# Patient Record
Sex: MALE | Race: OTHER | HISPANIC OR LATINO | URBAN - METROPOLITAN AREA
[De-identification: names, ages, dates, MRNs, and addresses within clinical notes are randomized per-mention and may not be internally consistent; named-entity substitution may affect disease eponyms.]

---

## 2023-02-13 ENCOUNTER — EMERGENCY (EMERGENCY)
Facility: HOSPITAL | Age: 63
LOS: 1 days | Discharge: HOME | End: 2023-02-13
Attending: EMERGENCY MEDICINE
Payer: MEDICAID

## 2023-02-13 VITALS
SYSTOLIC BLOOD PRESSURE: 129 MMHG | HEART RATE: 79 BPM | TEMPERATURE: 99 F | RESPIRATION RATE: 17 BRPM | DIASTOLIC BLOOD PRESSURE: 77 MMHG | WEIGHT: 261.91 LBS | HEIGHT: 66 IN | OXYGEN SATURATION: 97 %

## 2023-02-13 PROCEDURE — 99284 EMERGENCY DEPT VISIT MOD MDM: CPT

## 2023-02-13 PROCEDURE — 99284 EMERGENCY DEPT VISIT MOD MDM: CPT | Mod: 25

## 2023-02-13 PROCEDURE — 93971 EXTREMITY STUDY: CPT

## 2023-02-13 PROCEDURE — 93971 EXTREMITY STUDY: CPT | Mod: 26,RT

## 2023-02-13 RX ADMIN — Medication 100 MILLIGRAM(S): at 18:13

## 2023-02-13 NOTE — ED PROVIDER NOTE - PATIENT PORTAL LINK FT
You can access the FollowMyHealth Patient Portal offered by [facility] by registering at the following website: [email or web address]. By joining [employer]’s FollowMyHealth portal, you will also be able to view your health information using other applications (apps) compatible with our system.

## 2023-02-13 NOTE — ED PROVIDER NOTE - NSFOLLOWUPINSTRUCTIONS_ED_ALL_ED_FT
Cellulitis, Adult    A person's legs and feet. One leg is normal and the other leg is affected by cellulitis.   Cellulitis is a skin infection. The infected area is usually warm, red, swollen, and tender. This condition occurs most often in the arms and lower legs. The infection can travel to the muscles, blood, and underlying tissue and become serious. It is very important to get treated for this condition.      What are the causes?    Cellulitis is caused by bacteria. The bacteria enter through a break in the skin, such as a cut, burn, insect bite, open sore, or crack.      What increases the risk?    This condition is more likely to occur in people who:  •Have a weak body defense system (immune system).      •Have open wounds on the skin, such as cuts, burns, bites, and scrapes. Bacteria can enter the body through these open wounds.      •Are older than 60 years of age.      •Have diabetes.      •Have a type of long-lasting (chronic) liver disease (cirrhosis) or kidney disease.      •Are obese.    •Have a skin condition such as:  •Itchy rash (eczema).      •Slow movement of blood in the veins (venous stasis).      •Fluid buildup below the skin (edema).        •Have had radiation therapy.      •Use IV drugs.        What are the signs or symptoms?    Symptoms of this condition include:  •Redness, streaking, or spotting on the skin.      •Swollen area of the skin.      •Tenderness or pain when an area of the skin is touched.      •Warm skin.      •A fever.      •Chills.      •Blisters.        How is this diagnosed?    This condition is diagnosed based on a medical history and physical exam. You may also have tests, including:  •Blood tests.      •Imaging tests.        How is this treated?    Treatment for this condition may include:  •Medicines, such as antibiotic medicines or medicines to treat allergies (antihistamines).      •Supportive care, such as rest and application of cold or warm cloths (compresses) to the skin.      •Hospital care, if the condition is severe.      The infection usually starts to get better within 1–2 days of treatment.      Follow these instructions at home:  A comparison of three sample cups showing dark yellow, yellow, and pale yellow urine.   Medicines     •Take over-the-counter and prescription medicines only as told by your health care provider.      •If you were prescribed an antibiotic medicine, take it as told by your health care provider. Do not stop taking the antibiotic even if you start to feel better.      General instructions     •Drink enough fluid to keep your urine pale yellow.      • Do not touch or rub the infected area.      •Raise (elevate) the infected area above the level of your heart while you are sitting or lying down.      •Apply warm or cold compresses to the affected area as told by your health care provider.      •Keep all follow-up visits as told by your health care provider. This is important. These visits let your health care provider make sure a more serious infection is not developing.        Contact a health care provider if:    •You have a fever.      •Your symptoms do not begin to improve within 1–2 days of starting treatment.      •Your bone or joint underneath the infected area becomes painful after the skin has healed.      •Your infection returns in the same area or another area.      •You notice a swollen bump in the infected area.      •You develop new symptoms.      •You have a general ill feeling (malaise) with muscle aches and pains.        Get help right away if:    •Your symptoms get worse.      •You feel very sleepy.      •You develop vomiting or diarrhea that persists.      •You notice red streaks coming from the infected area.      •Your red area gets larger or turns dark in color.      These symptoms may represent a serious problem that is an emergency. Do not wait to see if the symptoms will go away. Get medical help right away. Call your local emergency services (911 in the [place] Do not drive yourself to the hospital.       Summary    •Cellulitis is a skin infection. This condition occurs most often in the arms and lower legs.      •Treatment for this condition may include medicines, such as antibiotic medicines or antihistamines.      •Take over-the-counter and prescription medicines only as told by your health care provider. If you were prescribed an antibiotic medicine, do not stop taking the antibiotic even if you start to feel better.      •Contact a health care provider if your symptoms do not begin to improve within 1–2 days of starting treatment or your symptoms get worse.      •Keep all follow-up visits as told by your health care provider. This is important. These visits let your health care provider make sure that a more serious infection is not developing.      This information is not intended to replace advice given to you by your health care provider. Make sure you discuss any questions you have with your health care provider.

## 2023-02-13 NOTE — ED PROVIDER NOTE - PHYSICAL EXAMINATION
Right upper arm with erythema and warmth which is well-circumscribed there is no swelling no fluctuance.  There is 2+ radial pulse.  No appreciable swelling to the remainder of the arm.  Heart is regular lungs are clear

## 2023-02-13 NOTE — ED ADULT TRIAGE NOTE - GLASGOW COMA SCALE: EYE OPENING, MLM
COPD EDUCATION by COPD CLINICAL EDUCATOR  11/30/2019 at 6:32 AM by Jaylin Hurd     Patient interviewed by COPD education team. Smoking Cessation Intervention declined by patient- she states cessation >6months ago        
(E4) spontaneous

## 2023-02-13 NOTE — ED ADULT TRIAGE NOTE - CHIEF COMPLAINT QUOTE
Pt reports received flu and pneumonia vaccine on 02/09, c/o Rt arm pain and redness to injection site x 4 days.

## 2023-02-13 NOTE — ED PROVIDER NOTE - CLINICAL SUMMARY MEDICAL DECISION MAKING FREE TEXT BOX
Patient with right arm redness and warmth suggestive of cellulitis.  Duplex is negative for DVT.  no suspicion for abscess. Home with antibiotics and return precautions.

## 2023-02-13 NOTE — ED PROVIDER NOTE - OBJECTIVE STATEMENT
Patient with hypertension presents with several days of right upper arm swelling and pain.  He had flu vaccine in the left arm and he thinks the pneumonia vaccine in the right arm about a week ago.  He then subsequently developed swelling to the right upper arm that is been getting progressively.  Patient also notes subjective fever.  Denies any other trauma.

## 2023-05-17 ENCOUNTER — EMERGENCY (EMERGENCY)
Facility: HOSPITAL | Age: 63
LOS: 1 days | Discharge: HOME | End: 2023-05-17
Attending: EMERGENCY MEDICINE
Payer: MEDICAID

## 2023-05-17 VITALS
HEIGHT: 66 IN | HEART RATE: 74 BPM | TEMPERATURE: 98 F | SYSTOLIC BLOOD PRESSURE: 130 MMHG | RESPIRATION RATE: 20 BRPM | WEIGHT: 257.94 LBS | DIASTOLIC BLOOD PRESSURE: 83 MMHG | OXYGEN SATURATION: 96 %

## 2023-05-17 PROCEDURE — 99283 EMERGENCY DEPT VISIT LOW MDM: CPT

## 2023-05-17 RX ORDER — PSEUDOEPHEDRINE HCL 30 MG
1 TABLET ORAL
Qty: 7 | Refills: 0
Start: 2023-05-17 | End: 2023-05-23

## 2023-05-17 RX ORDER — DEXAMETHASONE 0.5 MG/5ML
12 ELIXIR ORAL ONCE
Refills: 0 | Status: COMPLETED | OUTPATIENT
Start: 2023-05-17 | End: 2023-05-17

## 2023-05-17 RX ORDER — PSEUDOEPHEDRINE HCL 30 MG
60 TABLET ORAL ONCE
Refills: 0 | Status: COMPLETED | OUTPATIENT
Start: 2023-05-17 | End: 2023-05-17

## 2023-05-17 RX ORDER — KETOTIFEN FUMARATE 0.34 MG/ML
1 SOLUTION OPHTHALMIC
Qty: 5 | Refills: 0
Start: 2023-05-17 | End: 2023-05-23

## 2023-05-17 RX ORDER — FLUTICASONE PROPIONATE 50 MCG
1 SPRAY, SUSPENSION NASAL
Qty: 9 | Refills: 0
Start: 2023-05-17 | End: 2023-06-15

## 2023-05-17 RX ADMIN — Medication 1 TABLET(S): at 15:10

## 2023-05-17 RX ADMIN — Medication 12 MILLIGRAM(S): at 15:11

## 2023-05-17 RX ADMIN — Medication 60 MILLIGRAM(S): at 15:10

## 2023-05-17 NOTE — ED PROVIDER NOTE - IV ALTEPLASE ADMIN OUTSIDE HIDDEN
Terry Chauhan is being seen as follow up.    PCP:  Yahir Acosta APNP   Medication verified and med list updated  Denies known Latex allergy or symptoms of Latex sensitivity  Tobacco history verified.  Patient would like communication of their results via:  [phone]  Verbal permission granted by patient to leave a detailed message with medical information on answering machine at phone number given?  yes    If female, are you pregnant, trying to become pregnant, or breastfeeding?  NA  Pacemaker/Defibrillator:  no  Taking blood thinners:  Yes Eliquis   Allergy to lidocaine:  no  Nurse's notes reviewed and accepted.    CHIEF COMPLAINT:  Office Visit and Derm Problem    HISTORY OF PRESENT ILLNESS:  Terry Chauhan is a 86 year old male who presents for evaluation of SARA right arm, SARA left shoulder.    Location:  Right arm  Duration:  6 months  Symptoms:  no associated symptoms  Changes over time:  growing in size and got hard  Treatments:  None    Location:  Left shoulder  Duration:  1 month  Symptoms:  no associated symptoms  Changes over time:  growing in size  Treatments:  none    History of skin cancer--Basal cell carcinoma right lateral neck 12-8-22 status post excision by Dr. Salinas   Family history of skin cancer--No family history of skin cancer  History of severe sunburns--Negative  Sunscreens--negative  Outdoor hobbies--gardening, outside all the time  Occupation--retired auto body work   Tanning varela use--none   Last total skin examination--3/17/23    ALLERGIES:   Allergen Reactions   • Codeine Camsylate HIVES   • Penicillins HIVES       Medication list verified.    PHYSICAL EXAMINATION:  Wt Readings from Last 1 Encounters:   05/01/23 91.6 kg (202 lb)         Constitutional:  No acute distress, well nourished and well groomed  Neurologic:  Alert & oriented to person, place and time, appropriate affect and mood and pleasant   Skin:  Upper body skin exam performed today including head (face and  scalp), neck, chest, abdomen, back, right upper extremity, left upper extremity.  Pertinent findings include:   - Left upper arm 4 mm thick crust underlying erythema  - R forearm 4 mm hyperkeratotic papule with underlying erythema   - depressed erythematous papule, left alar rim                       ASSESSMENT/PLAN:  Neoplasm of uncertain behavior, left alar rim/left upper arm/right forearm:  Lesions on the left upper arm and right forearm were biopsy today to rule out AK versus nonmelanoma skin cancer versus wart.  Clinically, they were both cutaneous horns.  Depressed plaque on the left alar rim biopsy to rule out basal cell carcinoma versus scar.  - Surgical Pathology, Dermatologic  - TANGENTIAL BIOPSY OF SKIN SINGLE LESION  - TANGENTIAL BIOPSY OF SKIN EACH SEPARATE OR ADDITIONAL LESION       Return pending biopsy results.     Diagnosis was discussed with patient.  When applicable, possible treatments, including medications were discussed, as well as alternative treatments.  Potential side effects were reviewed and patient voiced understanding.  All questions were answered.      On 5/4/2023, ILorraine RN scribed the services personally performed by Jocelyn Naranjo MD   The documentation recorded by the scribe accurately and completely reflects the service(s) I personally performed and the decisions made by me.     Shave Biopsy  Correct patient, procedure, site verified.  Verbal and written consent obtained.  Risks and benefits of the procedure were discussed including pain, bleeding, infection, scarring, nerve damage.  The left alar rim, left upper arm and right forearm (site) was/were cleansed with alcohol and anesthetized with 1% lidocaine with epinephrine.  The lesion was removed using a Dermablade.  Hemostasis was achieved using electrocautery followed by aluminum chloride.  Estimated blood loss less than 0.3 mL.  The site was covered with petrolatum ointment and a bandage.  Specimen was  labeled and sent to pathology.   Patient instructed to keep the area clean and dry for 24 hours and wound care instructions were given.  Patient tolerated the procedure well without any complications.           show

## 2023-05-17 NOTE — ED PROVIDER NOTE - PROGRESS NOTE DETAILS
feeling improved. advised cessation of oxymetolazone as will make congestion worse. rx for decongestants and abx for likely sinusitis. f/u with ENT. return precautions discussed.

## 2023-05-17 NOTE — ED ADULT NURSE NOTE - NSFALLUNIVINTERV_ED_ALL_ED
Bed/Stretcher in lowest position, wheels locked, appropriate side rails in place/Call bell, personal items and telephone in reach/Instruct patient to call for assistance before getting out of bed/chair/stretcher/Non-slip footwear applied when patient is off stretcher/Prole to call system/Physically safe environment - no spills, clutter or unnecessary equipment/Purposeful proactive rounding/Room/bathroom lighting operational, light cord in reach

## 2023-05-17 NOTE — ED PROVIDER NOTE - NSFOLLOWUPINSTRUCTIONS_ED_ALL_ED_FT
Infección de los senos paranasales en los adultos  Sinus Infection, Adult  A person's face, and a person's face showing an internal view of the sinuses. Here the sinuses contain mucus.  La infección de los senos paranasales, también llamada sinusitis, es la inflamación de los senos paranasales. Los senos paranasales son espacios vacíos en los huesos alrededor del erasmo. Los senos paranasales se encuentran en estos lugares:  Alrededor de los ojos.  En la mitad de la frente.  Detrás de la nariz.  En los pómulos.  Normalmente, la mucosidad drena a través de los senos. Cuando los tejidos nasales se inflaman o hinchan, la mucosidad puede quedar atrapada o bloqueada. [place] fomenta la proliferación de bacterias, virus y hongos, lo que produce infecciones. La mayoría de las infecciones de los senos paranasales son provocadas por un virus.    Tiago infección de los senos paranasales puede desarrollarse rápidamente. Puede durar hasta 4 semanas (aguda) o más de 12 semanas (crónica). Con frecuencia, la infección de los senos paranasales aparece después de un resfriado.    ¿Cuáles son las causas?  Esta afección es causada por cualquier sustancia que inflame los senos o evite que la mucosidad drene. [place] incluye:  Alergias.  Asma.  Infección por bacterias o virus.  Deformidades u obstrucciones en la nariz o los senos paranasales.  Crecimientos anormales en la nariz (pólipos nasales).  Agentes contaminantes, lady sustancias químicas o irritantes presentes en el aire.  Infección por hongos. [place] es poco frecuente.  ¿Qué incrementa el riesgo?  Es más probable que tenga esta afección si:  Tienen debilitado el sistema de defensa del organismo (sistema inmunitario).  [place] o bucea mucho.  Abusa de los aerosoles nasales.  Fuma.  ¿Cuáles son los signos o síntomas?  Los principales síntomas de esta afección son dolor y sensación de presión alrededor de los senos paranasales afectados. Otros síntomas incluyen:  Nariz tapada o congestión que dificulta la respiración por la nariz.  Secreción espesa de color amarillo o verdoso que sale de la nariz.  Dolor, hinchazón y calor en los senos paranasales afectados.  Tos que puede empeorar por la noche.  Disminución del sentido del olfato y del gusto.  Mucosidad excesiva que se acumula en la garganta o la parte posterior de la nariz (goteo posnasal) y causa dolor de garganta o mal aliento.  Cansancio (fatiga).  Fiebre.  ¿Cómo se diagnostica?  Esta afección se diagnostica en función de lo siguiente:  Los síntomas.  [place] antecedentes médicos.  Un examen físico.  Pruebas para averiguar si la afección es aguda o crónica. Pueden incluir:  Revisar la nariz para evelyn si tiene pólipos nasales.  Observar los senos paranasales con un dispositivo que tiene tiago destiny (endoscopio).  Hacer pruebas para detectar alergias o bacterias.  Pruebas de diagnóstico por imágenes, lady resonancia magnética (RM) o tiago exploración por tomografía computarizada (TC).  En contadas ocasiones, se puede realizar tiago biopsia de hueso para descartar tipos más graves de infecciones por hongos en los senos paranasales.    ¿Cómo se trata?  El tratamiento para la infección de los senos paranasales depende de la causa y de si la afección es crónica o aguda.  Si la causa es un virus, los síntomas deberían desaparecer solos en el término de 10 días. Pueden darle medicamentos para aliviar los síntomas. Incluyen lo siguiente:  Medicamentos para encoger las fosas nasales hinchadas (descongestivos).  Un aerosol que ayedn la inflamación de las fosas nasales (corticoesteroide intranasal tópico).  Enjuagues que ayudan a eliminar la mucosidad espesa de la nariz (lavados con solución salina nasal).  Medicamentos para tratar alergias (antihistamínicos).  Analgésicos de venta vinod.  Si la causa son bacterias, el médico puede recomendarle que espere para evelyn si los síntomas mejoran. La mayoría de las infecciones bacterianas mejoran sin medicamentos antibióticos. Posiblemente le den antibióticos si usted tiene:  Tiago infección grave.  El sistema inmunitario debilitado.  Si la causa es un estrechamiento de las fosas nasales o la presencia de pólipos nasales, es posible que necesite cirugía.  Siga estas instrucciones en reyes casa:  Medicamentos    [place], use o aplíquese los medicamentos de venta vinod y recetados solamente lady se lo haya indicado el médico. Estos pueden incluir aerosoles nasales.  Si le recetaron un antibiótico, tómelo lady se lo haya indicado el médico. No deje de duy el antibiótico aunque comience a sentirse mejor.  Hidrátese y humidifique los ambientes    A comparison of three sample cups showing dark yellow, yellow, and pale yellow urine.  Beber suficiente líquido lady para mantener la orina de color amarillo pálido. Mantenerse hidratado lo ayudará a diluir la mucosidad.  Use un humidificador de vapor frío para mantener la humedad de reyes hogar por encima del 50 %.  Realice inhalaciones de vapor por 10 a 15 minutos, de 3 a 4 veces al día, o lady se lo haya indicado el médico. Puede hacer esto en el baño con el vapor del [place] de la ducha.  Limite la exposición al aire frío o seco.  Reposo    Descanse todo lo que pueda.  Duerma con la sukhdev levantada (elevada).  Asegúrese de dormir lo suficiente cada noche.  Instrucciones generales    A person washing hands with soap and water.  Aplíquese un paño tibio y húmedo en la sweetie 3 o 4 veces al día o lady se lo haya indicado el médico. [place] ayuda a calmar las molestias.  Hágase lavados con solución salina nasal con la frecuencia que le haya indicado el médico.  Lávese las bijan frecuentemente con agua y jabón para reducir la exposición a los gérmenes. Use desinfectante para bijan si no dispone de agua y jabón.  No fume. Evite estar cerca de personas que fuman (fumador pasivo).  Concurra a todas las visitas de seguimiento. [place] es importante.  Comuníquese con un médico si:  Tiene fiebre.  [place] síntomas empeoran.  Los síntomas no mejoran en el término de 10 días.  Solicite ayuda de inmediato si:  Tiene un dolor de sukhdev intenso.  Tiene vómitos persistentes.  Tiene dolor intenso o hinchazón en la kendra del erasmo o los ojos.  Tiene problemas de visión.  Presenta confusión.  Tiene el felipe rígido.  Tiene dificultad para respirar.  Estos síntomas pueden indicar tiago emergencia. Solicite ayuda de inmediato. Llame al 911.  No espere a evelyn si los síntomas desaparecen.  No conduzca por [place] propios medios hasta el hospital.  Resumen  La infección de los senos paranasales es el dolor y la inflamación de los senos paranasales. Los senos paranasales son espacios vacíos en los huesos alrededor del erasmo.  La causa de esta afección es la inflamación o hinchazón de los tejidos nasales. La hinchazón atrapa u obstruye el flujo de la mucosidad. [place] fomenta la proliferación de bacterias, virus y hongos, lo que produce infecciones.  Si le recetaron un antibiótico, tómelo lady se lo haya indicado el médico. No deje de duy el antibiótico aunque comience a sentirse mejor.  Concurra a todas las visitas de seguimiento. [place] es importante.  Esta información no tiene lady fin reemplazar el consejo del médico. Asegúrese de hacerle al médico cualquier pregunta que tenga.    Document Revised: 12/07/2022 Document Reviewed: 12/07/2022

## 2023-05-17 NOTE — ED PROVIDER NOTE - OBJECTIVE STATEMENT
63 year old male denies PMH coming in with nasal congestion, cough, and sinus pressure. pt states has had sinus pressure for the past couple of months and has been using oxymetolazone spray continuously. states he has to use it otherwise he is congested. states that he has thick brown nasal discharge now. denies all other complaints.

## 2023-08-21 NOTE — ED PROVIDER NOTE - SKIN NEGATIVE STATEMENT, MLM
no abrasions, no jaundice, no lesions, no pruritis, and no rashes.
Xray Chest 1 View- PORTABLE-Urgent

## 2024-03-19 ENCOUNTER — EMERGENCY (EMERGENCY)
Facility: HOSPITAL | Age: 64
LOS: 1 days | Discharge: HOME | End: 2024-03-19
Attending: STUDENT IN AN ORGANIZED HEALTH CARE EDUCATION/TRAINING PROGRAM
Payer: MEDICAID

## 2024-03-19 VITALS
RESPIRATION RATE: 18 BRPM | SYSTOLIC BLOOD PRESSURE: 135 MMHG | OXYGEN SATURATION: 95 % | HEART RATE: 80 BPM | WEIGHT: 264.55 LBS | TEMPERATURE: 98 F | HEIGHT: 66 IN | DIASTOLIC BLOOD PRESSURE: 84 MMHG

## 2024-03-19 VITALS
DIASTOLIC BLOOD PRESSURE: 86 MMHG | SYSTOLIC BLOOD PRESSURE: 132 MMHG | RESPIRATION RATE: 18 BRPM | HEART RATE: 70 BPM | OXYGEN SATURATION: 99 %

## 2024-03-19 LAB
ALBUMIN SERPL ELPH-MCNC: 3.7 G/DL — SIGNIFICANT CHANGE UP (ref 3.5–5)
ALP SERPL-CCNC: 59 U/L — SIGNIFICANT CHANGE UP (ref 40–120)
ALT FLD-CCNC: 53 U/L DA — SIGNIFICANT CHANGE UP (ref 10–60)
ANION GAP SERPL CALC-SCNC: 4 MMOL/L — LOW (ref 5–17)
APPEARANCE UR: CLEAR — SIGNIFICANT CHANGE UP
AST SERPL-CCNC: 26 U/L — SIGNIFICANT CHANGE UP (ref 10–40)
BASOPHILS # BLD AUTO: 0.04 K/UL — SIGNIFICANT CHANGE UP (ref 0–0.2)
BASOPHILS NFR BLD AUTO: 0.5 % — SIGNIFICANT CHANGE UP (ref 0–2)
BILIRUB SERPL-MCNC: 0.6 MG/DL — SIGNIFICANT CHANGE UP (ref 0.2–1.2)
BILIRUB UR-MCNC: NEGATIVE — SIGNIFICANT CHANGE UP
BUN SERPL-MCNC: 20 MG/DL — HIGH (ref 7–18)
CALCIUM SERPL-MCNC: 9.1 MG/DL — SIGNIFICANT CHANGE UP (ref 8.4–10.5)
CHLORIDE SERPL-SCNC: 105 MMOL/L — SIGNIFICANT CHANGE UP (ref 96–108)
CO2 SERPL-SCNC: 28 MMOL/L — SIGNIFICANT CHANGE UP (ref 22–31)
COLOR SPEC: YELLOW — SIGNIFICANT CHANGE UP
CREAT SERPL-MCNC: 1.19 MG/DL — SIGNIFICANT CHANGE UP (ref 0.5–1.3)
DIFF PNL FLD: NEGATIVE — SIGNIFICANT CHANGE UP
EGFR: 69 ML/MIN/1.73M2 — SIGNIFICANT CHANGE UP
EOSINOPHIL # BLD AUTO: 0.52 K/UL — HIGH (ref 0–0.5)
EOSINOPHIL NFR BLD AUTO: 7.1 % — HIGH (ref 0–6)
GLUCOSE SERPL-MCNC: 240 MG/DL — HIGH (ref 70–99)
GLUCOSE UR QL: NEGATIVE MG/DL — SIGNIFICANT CHANGE UP
HCT VFR BLD CALC: 43.6 % — SIGNIFICANT CHANGE UP (ref 39–50)
HGB BLD-MCNC: 15.1 G/DL — SIGNIFICANT CHANGE UP (ref 13–17)
IMM GRANULOCYTES NFR BLD AUTO: 0.4 % — SIGNIFICANT CHANGE UP (ref 0–0.9)
KETONES UR-MCNC: ABNORMAL MG/DL
LEUKOCYTE ESTERASE UR-ACNC: NEGATIVE — SIGNIFICANT CHANGE UP
LIDOCAIN IGE QN: 38 U/L — SIGNIFICANT CHANGE UP (ref 13–75)
LYMPHOCYTES # BLD AUTO: 2.09 K/UL — SIGNIFICANT CHANGE UP (ref 1–3.3)
LYMPHOCYTES # BLD AUTO: 28.5 % — SIGNIFICANT CHANGE UP (ref 13–44)
MAGNESIUM SERPL-MCNC: 1.8 MG/DL — SIGNIFICANT CHANGE UP (ref 1.6–2.6)
MCHC RBC-ENTMCNC: 30.3 PG — SIGNIFICANT CHANGE UP (ref 27–34)
MCHC RBC-ENTMCNC: 34.6 GM/DL — SIGNIFICANT CHANGE UP (ref 32–36)
MCV RBC AUTO: 87.6 FL — SIGNIFICANT CHANGE UP (ref 80–100)
MONOCYTES # BLD AUTO: 0.36 K/UL — SIGNIFICANT CHANGE UP (ref 0–0.9)
MONOCYTES NFR BLD AUTO: 4.9 % — SIGNIFICANT CHANGE UP (ref 2–14)
NEUTROPHILS # BLD AUTO: 4.29 K/UL — SIGNIFICANT CHANGE UP (ref 1.8–7.4)
NEUTROPHILS NFR BLD AUTO: 58.6 % — SIGNIFICANT CHANGE UP (ref 43–77)
NITRITE UR-MCNC: NEGATIVE — SIGNIFICANT CHANGE UP
NRBC # BLD: 0 /100 WBCS — SIGNIFICANT CHANGE UP (ref 0–0)
PH UR: 5 — SIGNIFICANT CHANGE UP (ref 5–8)
PLATELET # BLD AUTO: 201 K/UL — SIGNIFICANT CHANGE UP (ref 150–400)
POTASSIUM SERPL-MCNC: 3.8 MMOL/L — SIGNIFICANT CHANGE UP (ref 3.5–5.3)
POTASSIUM SERPL-SCNC: 3.8 MMOL/L — SIGNIFICANT CHANGE UP (ref 3.5–5.3)
PROT SERPL-MCNC: 7.6 G/DL — SIGNIFICANT CHANGE UP (ref 6–8.3)
PROT UR-MCNC: NEGATIVE MG/DL — SIGNIFICANT CHANGE UP
RBC # BLD: 4.98 M/UL — SIGNIFICANT CHANGE UP (ref 4.2–5.8)
RBC # FLD: 12.1 % — SIGNIFICANT CHANGE UP (ref 10.3–14.5)
SODIUM SERPL-SCNC: 137 MMOL/L — SIGNIFICANT CHANGE UP (ref 135–145)
SP GR SPEC: 1.02 — SIGNIFICANT CHANGE UP (ref 1–1.03)
UROBILINOGEN FLD QL: 0.2 MG/DL — SIGNIFICANT CHANGE UP (ref 0.2–1)
WBC # BLD: 7.33 K/UL — SIGNIFICANT CHANGE UP (ref 3.8–10.5)
WBC # FLD AUTO: 7.33 K/UL — SIGNIFICANT CHANGE UP (ref 3.8–10.5)

## 2024-03-19 PROCEDURE — 99284 EMERGENCY DEPT VISIT MOD MDM: CPT

## 2024-03-19 PROCEDURE — 83735 ASSAY OF MAGNESIUM: CPT

## 2024-03-19 PROCEDURE — T1013: CPT

## 2024-03-19 PROCEDURE — 81003 URINALYSIS AUTO W/O SCOPE: CPT

## 2024-03-19 PROCEDURE — 74176 CT ABD & PELVIS W/O CONTRAST: CPT | Mod: 26,MC

## 2024-03-19 PROCEDURE — 82962 GLUCOSE BLOOD TEST: CPT

## 2024-03-19 PROCEDURE — 85025 COMPLETE CBC W/AUTO DIFF WBC: CPT

## 2024-03-19 PROCEDURE — 83690 ASSAY OF LIPASE: CPT

## 2024-03-19 PROCEDURE — 80053 COMPREHEN METABOLIC PANEL: CPT

## 2024-03-19 PROCEDURE — 99284 EMERGENCY DEPT VISIT MOD MDM: CPT | Mod: 25

## 2024-03-19 PROCEDURE — 96374 THER/PROPH/DIAG INJ IV PUSH: CPT

## 2024-03-19 PROCEDURE — 36415 COLL VENOUS BLD VENIPUNCTURE: CPT

## 2024-03-19 PROCEDURE — 74176 CT ABD & PELVIS W/O CONTRAST: CPT | Mod: MC

## 2024-03-19 RX ORDER — KETOROLAC TROMETHAMINE 30 MG/ML
15 SYRINGE (ML) INJECTION ONCE
Refills: 0 | Status: DISCONTINUED | OUTPATIENT
Start: 2024-03-19 | End: 2024-03-19

## 2024-03-19 RX ORDER — SODIUM CHLORIDE 9 MG/ML
1000 INJECTION INTRAMUSCULAR; INTRAVENOUS; SUBCUTANEOUS ONCE
Refills: 0 | Status: COMPLETED | OUTPATIENT
Start: 2024-03-19 | End: 2024-03-19

## 2024-03-19 RX ADMIN — Medication 15 MILLIGRAM(S): at 15:55

## 2024-03-19 RX ADMIN — SODIUM CHLORIDE 1000 MILLILITER(S): 9 INJECTION INTRAMUSCULAR; INTRAVENOUS; SUBCUTANEOUS at 15:54

## 2024-03-19 RX ADMIN — Medication 15 MILLIGRAM(S): at 17:40

## 2024-03-19 NOTE — ED PROVIDER NOTE - OBJECTIVE STATEMENT
63-year-old male, PMH of HTN, HLD, presenting with several days of worsening flank pain.  Patient reports he has had pain in his kidneys for the past few months but became worse over the past few days.  Reports he has some pain and burning with urination but denies any blood in his urine.  No fever, chest pain, trouble breathing or abdominal pain.

## 2024-03-19 NOTE — ED ADULT NURSE NOTE - CHIEF COMPLAINT QUOTE
Nicholas flank pain w dysuria x 2 mnths, chills w N/V w dizziness no hematuria  Right sided headache w left arm numbness for a few days

## 2024-03-19 NOTE — ED ADULT NURSE NOTE - OBJECTIVE STATEMENT
Patient present to ED with left flank pain x 2 months was seen by PMD and told he had stones to drink fluids but pain continues , took motrin prior to coming to ER with minmal relief pain level at present time 8/10

## 2024-03-19 NOTE — ED PROVIDER NOTE - PATIENT PORTAL LINK FT
You can access the FollowMyHealth Patient Portal offered by [facility] by registering at the following website: [email or web address]. By joining [email or web address]’s FollowMyHealth portal, you will also be able to view your health information using other applications (apps) compatible with our system.

## 2024-03-19 NOTE — ED PROVIDER NOTE - NSFOLLOWUPINSTRUCTIONS_ED_ALL_ED_FT
Le atendieron en urgencias por dolor en el flanco.    Olvin un seguimiento con reyes médico de atención primaria dentro de las próximas 24 a 48 horas.    [employer] ibuprofeno y Tylenol según lo prescrito en los frascos para controlar el dolor.    Si tiene algún síntoma que empeora, dolor de sukhdev intenso, dolor en el pecho, dificultad para respirar, regrese al departamento de emergencias.    Translation via Google Translate. Cannot guarantee accuracy.     You were seen in the emergency department for flank pain.     Please follow-up with your primary care doctor within the next 24-48 hours.    Please take Ibuprofen and Tylenol as prescribed on the bottles for pain control.     If you have any worsening symptoms, severe headache, chest pain, trouble breathing, please return to the emergency department.

## 2024-03-19 NOTE — ED PROVIDER NOTE - CLINICAL SUMMARY MEDICAL DECISION MAKING FREE TEXT BOX
63-year-old male presenting with flank pain and dysuria.  Concern for kidney stone versus UTI/pyelonephritis.  Will obtain labs, UA and CT abdomen/pelvis to assess.

## 2024-03-19 NOTE — ED ADULT TRIAGE NOTE - CHIEF COMPLAINT QUOTE
Nicholas flank pain w dysuria x 2 mnths, chills w N/V w dizziness no hematuria Nicholas flank pain w dysuria x 2 mnths, chills w N/V w dizziness no hematuria  Right sided headache w left arm numbness for a few days

## 2024-03-19 NOTE — ED ADULT NURSE NOTE - NS TRANSFER PATIENT BELONGINGS
Outpatient Postoperative Visit     CC:   Chief Complaint   Patient presents with    Post-Op Check     Post Op: LAPAROSCOPIC RIGHT SALPINGOOPHORECTOMY, ABLATION OF ENDOMETRIOSIS, PELVIC WASHINGS  POSSIBLE OOPHORECTOMY, OTHER INDICATED PROCEDURES         Subjective:  22 y.o.  here for evaluation s/p LAPAROSCOPIC RIGHT SALPINGOOPHORECTOMY, ABLATION OF ENDOMETRIOSIS, PELVIC WASHINGS  on 24   Pt seen and examined. Doing well. No concerns complaints. Pain well controlled. +Flatus.     Review of Systems - The following ROS was otherwise negative, except as noted in the HPI: constitutional, respiratory, cardiovascular, gastrointestinal, genitourinary    Objective:  /73   Pulse 98   Temp 97 °F (36.1 °C) (Infrared)   Ht 1.702 m (5' 7\")   Wt 123.8 kg (273 lb)   LMP 2024 (Approximate)   BMI 42.76 kg/m²   General: Alert, well appearing, no acute distress  Abdomen: Soft, appropriately tender to palpation, non-distended  Incision: Appears c/d/i, no significant drainage or erythema  Extremities: No redness or tenderness in LE, neg Pamella's sign     Path:   FINAL DIAGNOSIS:     Right ovary and fallopian tube, salpingo-oophorectomy:   -Benign ovary with multiple follicular cysts.   -Fallopian tube with extensive stromal congestion and hemorrhage.   -No evidence of atypia or malignancy.      MUTGE/MUTGE     Assessment/Plan:   Diagnosis Orders   1. Postoperative visit        2. S/P laparoscopy        3. S/P right oophorectomy        4. Endometriosis determined by laparoscopy  norgestimate-ethinyl estradiol (SPRINTEC 28) 0.25-35 MG-MCG per tablet      5. Pelvic pain        6. Ovarian mass, right        7. Female-to-male transgender person          - reviewed operative course and findings  - laparoscopy consistent with endometriosis, recommend hormonal suppression, reviewed options   - elects to try OCPs for recurrent pelvic pain   - return precautions reviewed     Follow Up  Return if symptoms worsen or fail  Clothing

## 2024-04-04 ENCOUNTER — APPOINTMENT (OUTPATIENT)
Dept: SURGERY | Facility: CLINIC | Age: 64
End: 2024-04-04
Payer: MEDICAID

## 2024-04-04 VITALS
SYSTOLIC BLOOD PRESSURE: 111 MMHG | HEIGHT: 66 IN | OXYGEN SATURATION: 95 % | HEART RATE: 82 BPM | DIASTOLIC BLOOD PRESSURE: 71 MMHG | WEIGHT: 260 LBS | BODY MASS INDEX: 41.78 KG/M2

## 2024-04-04 DIAGNOSIS — R10.9 UNSPECIFIED ABDOMINAL PAIN: ICD-10-CM

## 2024-04-04 DIAGNOSIS — J45.20 MILD INTERMITTENT ASTHMA, UNCOMPLICATED: ICD-10-CM

## 2024-04-04 DIAGNOSIS — Z86.69 PERSONAL HISTORY OF OTHER DISEASES OF THE NERVOUS SYSTEM AND SENSE ORGANS: ICD-10-CM

## 2024-04-04 DIAGNOSIS — Z87.39 PERSONAL HISTORY OF OTHER DISEASES OF THE MUSCULOSKELETAL SYSTEM AND CONNECTIVE TISSUE: ICD-10-CM

## 2024-04-04 DIAGNOSIS — Z86.79 PERSONAL HISTORY OF OTHER DISEASES OF THE CIRCULATORY SYSTEM: ICD-10-CM

## 2024-04-04 DIAGNOSIS — R10.32 LEFT LOWER QUADRANT PAIN: ICD-10-CM

## 2024-04-04 DIAGNOSIS — R10.31 RIGHT LOWER QUADRANT PAIN: ICD-10-CM

## 2024-04-04 DIAGNOSIS — Z78.9 OTHER SPECIFIED HEALTH STATUS: ICD-10-CM

## 2024-04-04 PROCEDURE — 99203 OFFICE O/P NEW LOW 30 MIN: CPT

## 2024-04-04 NOTE — REVIEW OF SYSTEMS
[Abdominal Pain] : abdominal pain [Negative] : Heme/Lymph [FreeTextEntry7] : nausea  [FreeTextEntry8] : discomfort to both sides of groin , denies feeling a bulge  [FreeTextEntry9] : low back pains

## 2024-04-04 NOTE — HISTORY OF PRESENT ILLNESS
[de-identified] : NATALIA CABRERA is a 64 year old M who is referred to the office for consultation visit, he presents w cc of having discomfort to the RLQ, radiating to the R side of groin. Patient states he was recently seen in the ED for low back/flank discomfort and was informed he has bilateral inguinal hernias.  Mostly feels groin discomfort when doing heavy lifting. Admits to chronic low back pains.  Also has some nausea and vomiting, regardless of eating meals, intermittently, over the past year.   CT A/P 03/19/24--  Small fat-containing left inguinal and umbilical hernias. No acute intra-abdominal pathology.

## 2024-04-04 NOTE — CONSULT LETTER
[Dear  ___] : Dear  [unfilled], [Consult Letter:] : I had the pleasure of evaluating your patient, [unfilled]. [Consult Closing:] : Thank you very much for allowing me to participate in the care of this patient.  If you have any questions, please do not hesitate to contact me. [Sincerely,] : Sincerely, [FreeTextEntry3] : Erick Hubbard MD

## 2024-04-04 NOTE — DATA REVIEWED
Second no show today. First letter sent 07/21/2017. Please send second no show letter.   [FreeTextEntry1] : ACC: [id number]     EXAM:  CT ABDOMEN AND PELVIS   ORDERED BY: JOJO MEEHAN  PROCEDURE DATE:  03/19/2024  INTERPRETATION:  CLINICAL INFORMATION: 63 years  Male with bilateral flank pain.  COMPARISON: None.  CONTRAST/COMPLICATIONS: IV Contrast: NONE Oral Contrast: NONE Complications: None reported at time of study completion  PROCEDURE: CT of the Abdomen and Pelvis was performed. Sagittal and coronal reformats were performed.  LIMITATIONS: Evaluation of the solid organs, vascular structures and GI tract is limited without oral and IV contrast.  FINDINGS: LOWER CHEST: Mild bibasilar dependent atelectasis mild bilateral lower lobe bronchiectasis.  LIVER: Within normal limits. BILE DUCTS: Normal caliber. GALLBLADDER: Within normal limits. SPLEEN: Within normal limits. PANCREAS: Within normal limits. ADRENALS: Within normal limits. KIDNEYS/URETERS: No hydroureteronephrosis or calculi.  BLADDER: Completely decompressed. REPRODUCTIVE ORGANS: Prostate within normal limits.  BOWEL: No bowel obstruction. Appendix is surgically absent. PERITONEUM: No ascites. VESSELS: Atherosclerotic changes. RETROPERITONEUM/LYMPH NODES: No lymphadenopathy. ABDOMINAL WALL: Small fat-containing left inguinal and umbilical hernias. BONES: Degenerative changes.  IMPRESSION: No acute intra-abdominal pathology. Chronic findings as above. -- End of Report ---

## 2024-04-04 NOTE — PHYSICAL EXAM
[No Rash or Lesion] : No rash or lesion [Alert] : alert [Oriented to Person] : oriented to person [Oriented to Place] : oriented to place [Oriented to Time] : oriented to time [Calm] : calm [de-identified] : A/Ox3; NAD. appears comfortable [de-identified] : EOMI; sclera anicteric. [de-identified] : no cervical lymphadenopathy  [de-identified] : airway patent, no use of accessory muscles [de-identified] : abd obese, soft NT ND no abdominal wall defect /hernias noted  [de-identified] : No penile discharge or lesions. No scrotal swelling or discoloration. Testes descended bilaterally, smooth, without masses. Epididymis non-tender. No defect/hernias felt to either side of groin  [de-identified] : +ROM, normal gait

## 2024-04-04 NOTE — ASSESSMENT
[FreeTextEntry1] : IMP: 65 yo Polish speaking M, here for evaluation of inguinal hernias and c/o abdominal discomfort.  CT A/P 03/19/24--  Small fat-containing left inguinal and umbilical hernias. No acute intra-abdominal pathology. No appreciable hernias felt on exam.

## 2024-04-04 NOTE — PLAN
[FreeTextEntry1] : US ABD--R/O gallstones US Pelvis BL RTO after the study   Patient's questions and concerns addressed to their satisfaction, and patient verbalized an understanding of the information discussed.

## 2024-04-12 ENCOUNTER — APPOINTMENT (OUTPATIENT)
Dept: ULTRASOUND IMAGING | Facility: CLINIC | Age: 64
End: 2024-04-12
Payer: MEDICAID

## 2024-04-12 PROCEDURE — 76700 US EXAM ABDOM COMPLETE: CPT

## 2024-04-12 PROCEDURE — 76856 US EXAM PELVIC COMPLETE: CPT

## 2024-04-18 ENCOUNTER — APPOINTMENT (OUTPATIENT)
Dept: CT IMAGING | Facility: CLINIC | Age: 64
End: 2024-04-18

## 2024-08-07 NOTE — ED ADULT NURSE NOTE - NS ED NURSE DISCH DISPOSITION
[facility] lab called stated the nicotine test was rejected and cancelled due to being unspun.    Will need reordered and re-drawn at a lab     Discharged

## 2024-10-04 PROBLEM — I25.10 ATHEROSCLEROTIC HEART DISEASE OF NATIVE CORONARY ARTERY WITHOUT ANGINA PECTORIS: Chronic | Status: ACTIVE | Noted: 2024-08-06

## 2024-10-04 PROBLEM — I10 ESSENTIAL (PRIMARY) HYPERTENSION: Chronic | Status: ACTIVE | Noted: 2024-08-06

## 2024-10-10 ENCOUNTER — APPOINTMENT (OUTPATIENT)
Age: 64
End: 2024-10-10

## 2024-10-15 ENCOUNTER — APPOINTMENT (OUTPATIENT)
Age: 64
End: 2024-10-15
Payer: MEDICAID

## 2024-10-15 VITALS
OXYGEN SATURATION: 96 % | BODY MASS INDEX: 41.78 KG/M2 | HEART RATE: 85 BPM | HEIGHT: 66 IN | DIASTOLIC BLOOD PRESSURE: 80 MMHG | SYSTOLIC BLOOD PRESSURE: 120 MMHG | WEIGHT: 260 LBS

## 2024-10-15 DIAGNOSIS — M25.571 PAIN IN RIGHT ANKLE AND JOINTS OF RIGHT FOOT: ICD-10-CM
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
## 2024-10-15 PROCEDURE — 99204 OFFICE O/P NEW MOD 45 MIN: CPT

## 2024-10-22 ENCOUNTER — APPOINTMENT (OUTPATIENT)
Dept: MRI IMAGING | Facility: CLINIC | Age: 64
End: 2024-10-22
Payer: MEDICAID

## 2024-10-22 PROCEDURE — 73721 MRI JNT OF LWR EXTRE W/O DYE: CPT | Mod: RT

## 2024-10-25 RX ORDER — METHYLPREDNISOLONE 4 MG/1
4 TABLET ORAL
Qty: 1 | Refills: 0 | Status: ACTIVE | COMMUNITY
Start: 2024-10-15 | End: 1900-01-01

## 2024-10-29 ENCOUNTER — APPOINTMENT (OUTPATIENT)
Age: 64
End: 2024-10-29
Payer: MEDICAID

## 2024-10-29 DIAGNOSIS — S93.429S SPRAIN OF DELTOID LIGAMENT OF UNSPECIFIED ANKLE, SEQUELA: ICD-10-CM

## 2024-10-29 DIAGNOSIS — S90.00XS: ICD-10-CM

## 2024-10-29 PROCEDURE — 99213 OFFICE O/P EST LOW 20 MIN: CPT

## 2024-12-02 NOTE — ED PROVIDER NOTE - PATIENT PORTAL LINK FT
You can access the FollowMyHealth Patient Portal offered by [facility] by registering at the following website: [email or web address]. By joining [employer]’s FollowMyHealth portal, you will also be able to view your health information using other applications (apps) compatible with our system.
Additional Progress Note...

## 2024-12-03 ENCOUNTER — APPOINTMENT (OUTPATIENT)
Age: 64
End: 2024-12-03
Payer: MEDICAID

## 2024-12-03 VITALS
HEART RATE: 100 BPM | OXYGEN SATURATION: 96 % | DIASTOLIC BLOOD PRESSURE: 68 MMHG | WEIGHT: 260 LBS | SYSTOLIC BLOOD PRESSURE: 112 MMHG | BODY MASS INDEX: 41.78 KG/M2 | HEIGHT: 66 IN

## 2024-12-03 DIAGNOSIS — M54.50 LOW BACK PAIN, UNSPECIFIED: ICD-10-CM

## 2024-12-03 DIAGNOSIS — M54.2 CERVICALGIA: ICD-10-CM

## 2024-12-03 PROCEDURE — 72110 X-RAY EXAM L-2 SPINE 4/>VWS: CPT

## 2024-12-03 PROCEDURE — 99214 OFFICE O/P EST MOD 30 MIN: CPT

## 2024-12-03 RX ORDER — CYCLOBENZAPRINE HYDROCHLORIDE 5 MG/1
5 TABLET, FILM COATED ORAL
Qty: 42 | Refills: 2 | Status: ACTIVE | COMMUNITY
Start: 2024-12-03 | End: 1900-01-01

## 2024-12-03 RX ORDER — DICLOFENAC SODIUM 10 MG/G
1 GEL TOPICAL DAILY
Qty: 1 | Refills: 1 | Status: ACTIVE | COMMUNITY
Start: 2024-12-03 | End: 1900-01-01

## 2024-12-03 RX ORDER — MELOXICAM 7.5 MG/1
7.5 TABLET ORAL
Qty: 28 | Refills: 1 | Status: ACTIVE | COMMUNITY
Start: 2024-12-03 | End: 1900-01-01

## 2025-01-28 ENCOUNTER — APPOINTMENT (OUTPATIENT)
Age: 65
End: 2025-01-28

## 2025-05-16 ENCOUNTER — EMERGENCY (EMERGENCY)
Facility: HOSPITAL | Age: 65
LOS: 1 days | End: 2025-05-16
Attending: EMERGENCY MEDICINE
Payer: MEDICAID

## 2025-05-16 VITALS
DIASTOLIC BLOOD PRESSURE: 80 MMHG | WEIGHT: 259.04 LBS | TEMPERATURE: 98 F | RESPIRATION RATE: 17 BRPM | HEART RATE: 85 BPM | SYSTOLIC BLOOD PRESSURE: 121 MMHG | OXYGEN SATURATION: 98 %

## 2025-05-16 PROCEDURE — 99284 EMERGENCY DEPT VISIT MOD MDM: CPT

## 2025-05-16 PROCEDURE — 73130 X-RAY EXAM OF HAND: CPT | Mod: 26,LT

## 2025-05-16 PROCEDURE — 73130 X-RAY EXAM OF HAND: CPT

## 2025-05-16 PROCEDURE — 99283 EMERGENCY DEPT VISIT LOW MDM: CPT | Mod: 25

## 2025-05-16 NOTE — ED PROVIDER NOTE - NSFOLLOWUPCLINICS_GEN_ALL_ED_FT
[facility]  Neurology  [address] [place] [address]  Phone: [phone]  Fax: [phone]

## 2025-05-16 NOTE — ED ADULT NURSE NOTE - CHPI ED NUR SYMPTOMS NEG
no abrasion/no back pain/no bruising/no deformity/no difficulty bearing weight/no fever/no numbness/no stiffness/no weakness

## 2025-05-16 NOTE — ED PROVIDER NOTE - NSFOLLOWUPINSTRUCTIONS_ED_ALL_ED_FT
ulnar nerve irritation- take motrin 600mg every 6 hrs as needed, tylenol 650mg every 4 hrs as needed, stay active, no heavy lifting and return if symptoms  worsens. see neurologist.    Irritación del nervio cubital: tome Motrin 600 mg cada 6 horas según sea necesario, Tylenol 650 mg cada 4 horas según sea necesario, manténgase activo, no levante objetos pesados ??y regrese si los síntomas empeoran. Consulte a un neurólogo.

## 2025-05-16 NOTE — ED PROVIDER NOTE - PHYSICAL EXAMINATION
left hand- normal appearance, radial pulse 2+, no erythema, left pinky involuntary locks but no sign of trigger finger. pain and sx reproducible with ulnar tapping, strength intact

## 2025-05-16 NOTE — ED PROVIDER NOTE - OBJECTIVE STATEMENT
65 yr old male with HTN, CAD, left sided cva? presents to ed c/o left 4th and 5th finger pain, tingling and locking sensation x 3 weeks. pt was stretching his left hand while using the other hand to push his fingers making his left hand hyperextend when sx developed. denies any blunt trauma.

## 2025-05-16 NOTE — ED PROVIDER NOTE - PATIENT PORTAL LINK FT
You can access the FollowMyHealth Patient Portal offered by [facility] by registering at the following website: [email or web address]. By joining [employer]’s FollowMyHealth portal, you will also be able to view your health information using other applications (apps) compatible with our system.

## 2025-05-16 NOTE — ED PROVIDER NOTE - CLINICAL SUMMARY MEDICAL DECISION MAKING FREE TEXT BOX
65 yr old male with HTN, CAD, left sided cva? presents to ed c/o left 4th and 5th finger pain, tingling and locking sensation x 3 weeks. pt was stretching his left hand while using the other hand to push his fingers making his left hand hyperextend when sx developed. denies any blunt trauma.    ulnar nerve irritation- pt requesting xr, doubtful fx. peripheral nerve sx not central -xr. neuro eval if persist